# Patient Record
Sex: FEMALE | Race: OTHER | NOT HISPANIC OR LATINO | Employment: FULL TIME | URBAN - METROPOLITAN AREA
[De-identification: names, ages, dates, MRNs, and addresses within clinical notes are randomized per-mention and may not be internally consistent; named-entity substitution may affect disease eponyms.]

---

## 2022-03-07 ENCOUNTER — OFFICE VISIT (OUTPATIENT)
Dept: URGENT CARE | Age: 22
End: 2022-03-07
Payer: COMMERCIAL

## 2022-03-07 VITALS
WEIGHT: 126.2 LBS | SYSTOLIC BLOOD PRESSURE: 128 MMHG | BODY MASS INDEX: 23.22 KG/M2 | RESPIRATION RATE: 18 BRPM | TEMPERATURE: 98 F | OXYGEN SATURATION: 100 % | HEART RATE: 80 BPM | DIASTOLIC BLOOD PRESSURE: 83 MMHG | HEIGHT: 62 IN

## 2022-03-07 DIAGNOSIS — R21 RASH OF BODY: Primary | ICD-10-CM

## 2022-03-07 PROCEDURE — 99213 OFFICE O/P EST LOW 20 MIN: CPT | Performed by: PHYSICIAN ASSISTANT

## 2022-03-07 RX ORDER — TRIAMCINOLONE ACETONIDE 1 MG/ML
LOTION TOPICAL 3 TIMES DAILY
Qty: 60 ML | Refills: 0 | Status: SHIPPED | OUTPATIENT
Start: 2022-03-07

## 2022-03-07 NOTE — PROGRESS NOTES
[facility]        NAME: Kay Garvey is a 19 y o  female  : 2000    MRN: [id number]  DATE: 2022  TIME: 4:18 PM    Assessment and Plan   Rash of body [R21]  1  Rash of body  triamcinolone (KENALOG) 0 1 % lotion    Ambulatory Referral to Dermatology     Patient with rash bilateral hips likely the result of sweat and uses be index  Patient is instructed use antihistamine as needed for itching she will continue to keep the areas clean and dry  She is also given triamcinolone ointment to treat  Patient will not use a cream over any open areas  Patient is provided with a referral for Dermatology which he will follow-up within approximately 1 week if symptoms are not improved  If symptoms worsen or spread she will report to the emergency room  Patient Instructions     Patient Instructions   Use steroid cream as directed  Use antihistamine as needed for itching  If symptoms are not improved in approximately 1 week follow-up with dermatology  If symptoms worsen report back or report to  the emergency room  Acute Rash   AMBULATORY CARE:   A rash  is irritated, red, or itchy skin or mucus membranes, such as the lining of your nose or throat  Acute means the rash starts suddenly, worsens quickly, and lasts a short time  Common causes include a disease or infection, a reaction to something you are allergic to, or certain medicines  Seek care immediately if:   · You have sudden trouble breathing or chest pain  · You are vomiting, have a headache or muscle aches, and your throat hurts  Call your doctor or dermatologist if:   · You have a fever  · You get open wounds from scratching your skin, or you have a wound that is red, swollen, or painful  · Your rash lasts longer than 3 months  · You have swelling or pain in your joints  · You have questions or concerns about your condition or care  Common types of rashes:   · Eczema  causes inflamed, itchy areas   Your skin may be dry, scaly, and thick  The outer layer may be damaged  Irritants, stress, or a family history of eczema make you more likely to get it  · Contact dermatitis  causes a small, itchy growth that may be flat or raised  It appears after you touch something that damages your skin or causes an allergic reaction  Examples include chemicals, metals, dye, soaps or detergents, and latex  · Atopic dermatitis  causes small, itchy, blister-like growths along skin lines and folds  The growths may ooze fluid and become scaly, crusted, or hard  You may have sore, dry skin or swollen eyes  This rash usually forms after you are around an allergen, are overheated, or wear rough clothing  · Urticaria (hives)  appears suddenly as patches and raised areas of swollen skin or mucus membrane  The area may itch or burn  Common causes include allergens, latex, certain foods, a bee sting, smoke, or a blood transfusion  · Pityriasis rosea  may appear before you get a disease caused by bacteria or a virus  The rash may look like a patch on your chest, back, or abdomen  The rash may spread to become small, red, cone-shaped bumps that usually grow in groups  Treatment  will depend on the condition causing your acute rash  You may need any of the following:  · Medicines  may be used to decrease itching or inflammation, or prevent or treat a bacterial infection  Medicines may also help your immune system fight infection or stop it from attacking your skin  · Ultraviolet phototherapy  means the rash is put under light  Light therapy helps treats atopic dermatitis or eczema that does not get better with steroids  It can help pityriasis rosea heal faster and decrease itching  Prevent a rash or care for your skin when you have a rash:  Dry skin can lead to more problems  Do not scratch your skin if it itches  You may cause a skin infection by scratching   The following may prevent dry skin, and help your skin look better:  · Help soothe your rash  Apply thick cream lotions or petroleum jelly  Cool compresses may also soothe your skin  Apply a cool compress or a cool, wet towel, and then cover it with a dry towel  · Use lukewarm water when you bathe  Hot water may damage your skin more  Pat your skin dry  Do not rub your skin with a towel  · Use detergents, soaps, shampoos, and bubble baths  made for sensitive skin  · Wear clothes made of cotton instead of nylon or wool  Cotton is softer, so it will not hurt your skin as much  Follow up with your healthcare providers as directed:  A dermatologist may help find the cause of your rash or help plan or change treatment  A dietitian may help with meal planning if you have a food allergy  Write down your questions so you remember to ask them during your visits  © [employer] 2022 Information is for End User's use only and may not be sold, redistributed or otherwise used for commercial purposes  All illustrations and images included in CareNotes® are the copyrighted property of [employer]  or [address] [place] [address]  The above information is an  only  It is not intended as medical advice for individual conditions or treatments  Talk to your doctor, nurse or pharmacist before following any medical regimen to see if it is safe and effective for you  Follow up with PCP in 3-5 days  Proceed to  ER if symptoms worsen  Chief Complaint     Chief Complaint   Patient presents with    Rash     area of concern is located on bilateral hips  started about 7 days ago  area is red, scabbed over in some areas, slightly swollen  History of Present Illness       49-year-old female presents with complaint of rash to bilateral hips and anterior abdomen  Patient reports that the rash to her hip started proximally 7 days ago    Patient reports that she was wearing spanned X which he wears regularly but also had a pair of pants over the top of these which she does normally do  Patient reports he were the same pants the following day and rash started shortly after that  It is irritated red and scabbed over  Patient reports she tried placing a Band-Aid over and feels like it got worse after that  Patient does reports the area is itchy but only mildly so  It is not worse at night  Patient denies any pain at this time  She has not have any additional rashes or itching elsewhere on the body  She denies changes in detergents, soaps, lotions, oils, shampoos, conditioners, medications, and diet  Denies fever and chills  No other concerns or complaints today  Review of Systems   Review of Systems   Skin: Positive for rash and wound  Current Medications       Current Outpatient Medications:     triamcinolone (KENALOG) 0 1 % lotion, Apply topically 3 (three) times a day, Disp: 60 mL, Rfl: 0    Current Allergies     Allergies as of 03/07/2022    (No Known Allergies)            The following portions of the patient's history were reviewed and updated as appropriate: allergies, current medications, past family history, past medical history, past social history, past surgical history and problem list      History reviewed  No pertinent past medical history  History reviewed  No pertinent surgical history  No family history on file  Medications have been verified  Objective   /83   Pulse 80   Temp 98 °F (36 7 °C)   Resp 18   Ht 5' 2" (1 575 m)   Wt 57 2 kg (126 lb 3 2 oz)   SpO2 100%   BMI 23 08 kg/m²   No LMP recorded  Physical Exam     Physical Exam  Vitals and nursing note reviewed  Constitutional:       General: She is awake  She is not in acute distress  Appearance: Normal appearance  She is well-developed and well-groomed  She is not ill-appearing, toxic-appearing or diaphoretic  HENT:      Head: Normocephalic and atraumatic        Right Ear: Hearing and external ear normal       Left Ear: Hearing and external ear normal    Eyes:      General: Lids are normal  Vision grossly intact  Gaze aligned appropriately  Cardiovascular:      Rate and Rhythm: Normal rate  Pulmonary:      Effort: Pulmonary effort is normal       Comments: Patient is speaking in full sentences with no increased respiratory effort  No audible wheezing or stridor  Abdominal:          Comments: Maculopapular rash noted as above  There are some clear vesicles noted over the top  Patient has excoriations with darkening of the skin to bilateral hip rashes  Hip rashes are rectangular in shape approximately 5 cm x 3 cm  There are small excoriations noted over the left hip  The right hip has a small silver plaque that appears to have been a open area that is healed and scabbed over  There is no active drainage no erythema or warmth noted  Musculoskeletal:      Cervical back: Normal range of motion  Skin:     General: Skin is warm and dry  Neurological:      Mental Status: She is alert and oriented to person, place, and time  Coordination: Coordination is intact  Gait: Gait is intact  Psychiatric:         Attention and Perception: Attention and perception normal          Mood and Affect: Mood and affect normal          Speech: Speech normal          Behavior: Behavior normal  Behavior is cooperative  Note: Portions of this record may have been created with voice recognition software  Occasional wrong word or "sound a like" substitutions may have occurred due to the inherent limitations of voice recognition software  Please read the chart carefully and recognize, using context, where substitutions have occurred  *

## 2022-03-07 NOTE — PATIENT INSTRUCTIONS
Use steroid cream as directed  Use antihistamine as needed for itching  If symptoms are not improved in approximately 1 week follow-up with dermatology  If symptoms worsen report back or report to  the emergency room  Acute Rash   AMBULATORY CARE:   A rash  is irritated, red, or itchy skin or mucus membranes, such as the lining of your nose or throat  Acute means the rash starts suddenly, worsens quickly, and lasts a short time  Common causes include a disease or infection, a reaction to something you are allergic to, or certain medicines  Seek care immediately if:   · You have sudden trouble breathing or chest pain  · You are vomiting, have a headache or muscle aches, and your throat hurts  Call your doctor or dermatologist if:   · You have a fever  · You get open wounds from scratching your skin, or you have a wound that is red, swollen, or painful  · Your rash lasts longer than 3 months  · You have swelling or pain in your joints  · You have questions or concerns about your condition or care  Common types of rashes:   · Eczema  causes inflamed, itchy areas  Your skin may be dry, scaly, and thick  The outer layer may be damaged  Irritants, stress, or a family history of eczema make you more likely to get it  · Contact dermatitis  causes a small, itchy growth that may be flat or raised  It appears after you touch something that damages your skin or causes an allergic reaction  Examples include chemicals, metals, dye, soaps or detergents, and latex  · Atopic dermatitis  causes small, itchy, blister-like growths along skin lines and folds  The growths may ooze fluid and become scaly, crusted, or hard  You may have sore, dry skin or swollen eyes  This rash usually forms after you are around an allergen, are overheated, or wear rough clothing  · Urticaria (hives)  appears suddenly as patches and raised areas of swollen skin or mucus membrane  The area may itch or burn   Common causes include allergens, latex, certain foods, a bee sting, smoke, or a blood transfusion  · Pityriasis rosea  may appear before you get a disease caused by bacteria or a virus  The rash may look like a patch on your chest, back, or abdomen  The rash may spread to become small, red, cone-shaped bumps that usually grow in groups  Treatment  will depend on the condition causing your acute rash  You may need any of the following:  · Medicines  may be used to decrease itching or inflammation, or prevent or treat a bacterial infection  Medicines may also help your immune system fight infection or stop it from attacking your skin  · Ultraviolet phototherapy  means the rash is put under light  Light therapy helps treats atopic dermatitis or eczema that does not get better with steroids  It can help pityriasis rosea heal faster and decrease itching  Prevent a rash or care for your skin when you have a rash:  Dry skin can lead to more problems  Do not scratch your skin if it itches  You may cause a skin infection by scratching  The following may prevent dry skin, and help your skin look better:  · Help soothe your rash  Apply thick cream lotions or petroleum jelly  Cool compresses may also soothe your skin  Apply a cool compress or a cool, wet towel, and then cover it with a dry towel  · Use lukewarm water when you bathe  Hot water may damage your skin more  Pat your skin dry  Do not rub your skin with a towel  · Use detergents, soaps, shampoos, and bubble baths  made for sensitive skin  · Wear clothes made of cotton instead of nylon or wool  Cotton is softer, so it will not hurt your skin as much  Follow up with your healthcare providers as directed:  A dermatologist may help find the cause of your rash or help plan or change treatment  A dietitian may help with meal planning if you have a food allergy  Write down your questions so you remember to ask them during your visits    © Copyright [employer] 2022 Information is for [employer] use only and may not be sold, redistributed or otherwise used for commercial purposes  All illustrations and images included in CareNotes® are the copyrighted property of [employer] Inc  or [employer]  The above information is an  only  It is not intended as medical advice for individual conditions or treatments  Talk to your doctor, nurse or pharmacist before following any medical regimen to see if it is safe and effective for you